# Patient Record
Sex: FEMALE
[De-identification: names, ages, dates, MRNs, and addresses within clinical notes are randomized per-mention and may not be internally consistent; named-entity substitution may affect disease eponyms.]

---

## 2019-08-12 ENCOUNTER — RESULT REVIEW (OUTPATIENT)
Age: 44
End: 2019-08-12

## 2021-05-21 ENCOUNTER — TRANSCRIPTION ENCOUNTER (OUTPATIENT)
Age: 46
End: 2021-05-21

## 2022-03-28 PROBLEM — Z00.00 ENCOUNTER FOR PREVENTIVE HEALTH EXAMINATION: Status: ACTIVE | Noted: 2022-03-28

## 2022-05-11 ENCOUNTER — APPOINTMENT (OUTPATIENT)
Dept: UROLOGY | Facility: CLINIC | Age: 47
End: 2022-05-11
Payer: COMMERCIAL

## 2022-05-11 VITALS
TEMPERATURE: 98 F | SYSTOLIC BLOOD PRESSURE: 97 MMHG | OXYGEN SATURATION: 100 % | RESPIRATION RATE: 16 BRPM | HEIGHT: 66 IN | BODY MASS INDEX: 18.32 KG/M2 | DIASTOLIC BLOOD PRESSURE: 68 MMHG | HEART RATE: 80 BPM | WEIGHT: 114 LBS

## 2022-05-11 DIAGNOSIS — N32.81 OVERACTIVE BLADDER: ICD-10-CM

## 2022-05-11 DIAGNOSIS — R39.16 STRAINING TO VOID: ICD-10-CM

## 2022-05-11 DIAGNOSIS — Z78.9 OTHER SPECIFIED HEALTH STATUS: ICD-10-CM

## 2022-05-11 DIAGNOSIS — R33.9 RETENTION OF URINE, UNSPECIFIED: ICD-10-CM

## 2022-05-11 DIAGNOSIS — K59.00 CONSTIPATION, UNSPECIFIED: ICD-10-CM

## 2022-05-11 DIAGNOSIS — N39.8 OTHER SPECIFIED DISORDERS OF URINARY SYSTEM: ICD-10-CM

## 2022-05-11 PROCEDURE — 51798 US URINE CAPACITY MEASURE: CPT

## 2022-05-11 PROCEDURE — 99205 OFFICE O/P NEW HI 60 MIN: CPT

## 2022-05-11 RX ORDER — TAMSULOSIN HYDROCHLORIDE 0.4 MG/1
0.4 CAPSULE ORAL
Qty: 60 | Refills: 2 | Status: ACTIVE | COMMUNITY
Start: 2022-05-11 | End: 1900-01-01

## 2022-05-11 NOTE — ASSESSMENT
[FreeTextEntry1] : \davin Isaac is a 47yo who presents with constipation, dysfunctional voiding, and overactive bladder/urgency incontinence. \par \par We reviewed her physical and exam findings. PVR was 0ml today. We discussed management of her constipation first with adding on fiber supplements, increasing her fruit and vegetable intake to 3-4 servings per day. We discussed referring to GI if she doesn't have improvement in her constipation.\par \par We discussed her dysfunctional voiding/pelvic floor dysfunction and starting pelvic floor PT. She has an appointment in June to start. We discussed taking a valium suppository if she has significant discomfort or pain with PT; however, she tolerated pelvic exam well today. In addition, we discussed starting tamsulosin to improve her urinary flow. We reviewed the risks and benefits of tamsulosin to help with the intermittent stream and straining to void. \par \par For her overactive bladder/UUI, we discussed fluid and behavioral modifications, pelvic floor PT. As she has tried myrbetriq without improvement in her symptoms and has dysfunctional voiding, we discussed third line therapies of PTNS and sacral neuromodulation/PNE. \par \par Written instructions with the above management was given to the patient. \par \par Follow up in 3-4 months.

## 2022-05-11 NOTE — END OF VISIT
[] : Fellow [FreeTextEntry3] : complex presentation, likely all related and contributing to her symptoms\par \par dysfunctional voiding symptoms present\par chronic severe constipation\par Pelvic floor dysfunction diagnosed in past, but not consistent on exam\par OAB with incontinence at times\par \par Counseled the patient on constipation and how it can affect the urinary tract. We discussed increasing water intake, daily fruits and vegetables, and sources of fiber.  We recommended 4 servings of whole fruit per day, excluding dried fruit or juices.  We also recommended supplementing soluble fiber intake with gummy fiber #2/day.\par \par refer to GI if no imperovement \par \par PFPT - she is scheduled in June \par Will hold of on vaginal valium, exam shows mild tightness\par \par OAB refractory to myrbetriq\par Will consider PNE/SNM in future if no benefit\par \par RTO 3-4 mo. after PFPT\par  [FreeTextEntry2] : The total time spent with the patient includes face to face time as well as time for documentation, ordering medications/labs/procedures, and care coordination, but I acknowledge it does not include time spent on any procedures performed (eg PVR, UDS, Cystoscopy, catheter changes, etc).\par  [Time Spent: ___ minutes] : I have spent [unfilled] minutes of time on the encounter.

## 2022-05-11 NOTE — PHYSICAL EXAM
[General Appearance - Well Developed] : well developed [General Appearance - Well Nourished] : well nourished [Normal Appearance] : normal appearance [Well Groomed] : well groomed [General Appearance - In No Acute Distress] : no acute distress [Edema] : no peripheral edema [] : no respiratory distress [Exaggerated Use Of Accessory Muscles For Inspiration] : no accessory muscle use [Abdomen Soft] : soft [Abdomen Tenderness] : non-tender [Urethral Meatus] : normal urethra [Urinary Bladder Findings] : the bladder was normal on palpation [External Female Genitalia] : normal external genitalia [Vagina] : normal vaginal exam [Normal Station and Gait] : the gait and station were normal for the patient's age [Skin Color & Pigmentation] : normal skin color and pigmentation [No Focal Deficits] : no focal deficits [Motor Exam] : the motor exam was normal [Affect] : the affect was normal [Mood] : the mood was normal [FreeTextEntry1] : No prolapse, CST/VST negative, some hypertonicity of pelvic floor

## 2022-05-11 NOTE — HISTORY OF PRESENT ILLNESS
[FreeTextEntry1] : \par Marlin presents for urgency, frequency for the last year. Denies dysuria. Unsure if she had gross hematuria. Reports pain with bladder filling. She reports straining to empty bladder, intermittent stream. Occasional incomplete emptying. She had seen 2 other urologists (Dr Ryan, Dr Hernández) and had cystoscopy with a negative biopsy and urodynamics. She was given an empiric course of ciprofloxacin without any relief in symptoms. She took myrbetriq 25mg for 2 weeks without improvement in symptoms. She was also given a Rx for pelvic floor PT, which she hasn't started because she wanted to see us first. \par \par DTF every 10 minutes\par Can hold up to 2 hours\par Nocturia x4\par UUI about once a month with more than a few drops, but less than full bladder\par Denies ROSENDO\par Tries not to wear pads \par \par Daily fluid intake includes 2oz milk, 4oz water, 4oz of juice or soda at night. 4oz wine weekly. \par \par Chronic constipation with BM every 3-4 days after drinking 4-6oz coffee. Hard stool. \par \par Perimenopausal, never on vaginal estrogen. \par \par PVR 0ml\par \par PMH: Herniated disk (lumbar?)\par PSH: Rhinoplasty, TOP, bladder biopsy\par

## 2022-07-29 ENCOUNTER — APPOINTMENT (OUTPATIENT)
Dept: UROGYNECOLOGY | Facility: CLINIC | Age: 47
End: 2022-07-29

## 2022-07-29 VITALS
HEART RATE: 80 BPM | TEMPERATURE: 97.6 F | WEIGHT: 114 LBS | BODY MASS INDEX: 18.32 KG/M2 | HEIGHT: 66 IN | OXYGEN SATURATION: 100 %

## 2022-07-29 VITALS
BODY MASS INDEX: 18.32 KG/M2 | DIASTOLIC BLOOD PRESSURE: 60 MMHG | SYSTOLIC BLOOD PRESSURE: 98 MMHG | HEART RATE: 75 BPM | WEIGHT: 114 LBS | TEMPERATURE: 97.6 F | OXYGEN SATURATION: 99 % | HEIGHT: 66 IN

## 2022-07-29 DIAGNOSIS — R35.0 FREQUENCY OF MICTURITION: ICD-10-CM

## 2022-07-29 DIAGNOSIS — R39.13 SPLITTING OF URINARY STREAM: ICD-10-CM

## 2022-07-29 DIAGNOSIS — M62.838 OTHER MUSCLE SPASM: ICD-10-CM

## 2022-07-29 DIAGNOSIS — R39.11 HESITANCY OF MICTURITION: ICD-10-CM

## 2022-07-29 PROCEDURE — 99204 OFFICE O/P NEW MOD 45 MIN: CPT

## 2022-07-29 NOTE — HISTORY OF PRESENT ILLNESS
[FreeTextEntry1] : No follow-up is a 46-year-old woman with chronic lifelong constipation and 18-year history of severe urinary frequency urgency.  She has had numerous your urologic work-ups including biopsy voiding dysfunction treatment pelvic floor physical therapy.  Severely low capacity bladder and involuntary bladder contractions are noted.\par \par Medical history includes back problems chronic constipation.\par \par Surgical history of bladder back biopsy which was negative.\par \par Reviewed Dr. Gregory's records reviewed outside urology records.\par \par Office examination shows a lean woman with mildly distended abdomen.  There is no discrete mass but the bowel is generalized full.\par External genitalia show atrophic changes and there is a urethral caruncle.  She declined any catheterization or bladder testing.  She was counseled in detail regarding the benefit of bedside CMG and PVR testing as well as urine analysis but declined.\par \par \par Based on my review of her medical information on the brief examination I have today.  This patient has voiding dysfunction severe urinary frequency urgency and lifelong constipation.\par \par It would seem clear with muscle spasm and voiding dysfunction as well as constant chronic constipation that Botox is not a good option for her.  To address both constipation and overactive bladder clearly a implanted neurostimulator would be the modality of choice.  This been counseled by Dr. Gregory and I endorsed the same.  I am no longer performing this procedure now and have suggested if she is ready to go for that procedure that she should return to Dr. Gregory.  Consideration of MRI defecography is an option in terms of evaluating the chronic constipation prior to neurostimulator shin.  However I do think that the neurostimulator pathway is inevitable for her\par \par Review of previous notes, labs, current prescriptions was performed.\par History , Physical counseling was performed. \par All questions answered in lay language\par Total time for encounter was   47    min\par A chaperone was present for the entirety of the encounter\par \par \par

## 2022-10-12 ENCOUNTER — APPOINTMENT (OUTPATIENT)
Dept: GASTROENTEROLOGY | Facility: CLINIC | Age: 47
End: 2022-10-12

## 2022-10-12 VITALS
HEIGHT: 66 IN | WEIGHT: 116 LBS | BODY MASS INDEX: 18.64 KG/M2 | SYSTOLIC BLOOD PRESSURE: 90 MMHG | DIASTOLIC BLOOD PRESSURE: 60 MMHG | OXYGEN SATURATION: 99 % | RESPIRATION RATE: 12 BRPM | TEMPERATURE: 97 F | HEART RATE: 71 BPM

## 2022-10-12 DIAGNOSIS — K59.09 OTHER CONSTIPATION: ICD-10-CM

## 2022-10-12 DIAGNOSIS — R14.0 ABDOMINAL DISTENSION (GASEOUS): ICD-10-CM

## 2022-10-12 DIAGNOSIS — R10.32 LEFT LOWER QUADRANT PAIN: ICD-10-CM

## 2022-10-12 PROCEDURE — 99204 OFFICE O/P NEW MOD 45 MIN: CPT

## 2022-10-12 NOTE — HISTORY OF PRESENT ILLNESS
[FreeTextEntry1] : She is a 46 old female with chronic constipation which is recently.  She also complains of intermittent crampy left lower quadrant abdominal pain associated with bloating.  She denies rectal bleeding or  weight loss.  She denies a family history of colon cancer.

## 2022-10-12 NOTE — ASSESSMENT
[FreeTextEntry1] : SOBEIDA JOHNSON was advised to undergo colonoscopy to which she agreed. The procedure will be performed in [facility] \[facility] with the assistance of an anesthesiologist. The patient was given a Suprep preparation prescription and understood the \par procedure as it was explained to her. She was given a booklet distributed by the [employer]\par  [employer] explaining the procedure in detail and she understood the risks of the procedure not limited to infection, bleeding,\par perforation or non- diagnosis of colorectal cancer. She was advised that she could not drive home, if she chooses to\par  receive sedation.\par \par Further diagnostic and treatment recommendations will be based upon the procedure and any biopsies, if they are taken.\par \par Thank you for allowing me to participate in this [facility] health care.\par \par , Best personal regards -- Don\par

## 2022-10-12 NOTE — REVIEW OF SYSTEMS
[Abdominal Pain] : abdominal pain [Constipation] : constipation [Bloating (gassiness)] : bloating [Negative] : Heme/Lymph

## 2022-10-21 RX ORDER — SODIUM SULFATE, POTASSIUM SULFATE AND MAGNESIUM SULFATE 1.6; 3.13; 17.5 G/177ML; G/177ML; G/177ML
17.5-3.13-1.6 SOLUTION ORAL TWICE DAILY
Qty: 2 | Refills: 0 | Status: ACTIVE | COMMUNITY
Start: 2022-10-21 | End: 1900-01-01

## 2022-10-24 RX ORDER — POLYETHYLENE GLYCOL 3350 AND ELECTROLYTES WITH LEMON FLAVOR 236; 22.74; 6.74; 5.86; 2.97 G/4L; G/4L; G/4L; G/4L; G/4L
236 POWDER, FOR SOLUTION ORAL
Qty: 1 | Refills: 0 | Status: ACTIVE | COMMUNITY
Start: 2022-10-24 | End: 1900-01-01

## 2022-11-28 ENCOUNTER — APPOINTMENT (OUTPATIENT)
Dept: GASTROENTEROLOGY | Facility: AMBULATORY SURGERY CENTER | Age: 47
End: 2022-11-28

## 2022-11-28 PROCEDURE — 45378 DIAGNOSTIC COLONOSCOPY: CPT | Mod: 53

## 2022-11-28 RX ORDER — POLYETHYLENE GLYCOL 3350 AND ELECTROLYTES WITH LEMON FLAVOR 236; 22.74; 6.74; 5.86; 2.97 G/4L; G/4L; G/4L; G/4L; G/4L
236 POWDER, FOR SOLUTION ORAL
Qty: 1 | Refills: 0 | Status: ACTIVE | COMMUNITY
Start: 2022-11-28 | End: 1900-01-01

## 2022-11-28 RX ORDER — LINACLOTIDE 145 UG/1
145 CAPSULE, GELATIN COATED ORAL
Qty: 30 | Refills: 4 | Status: ACTIVE | COMMUNITY
Start: 2022-11-28 | End: 1900-01-01